# Patient Record
Sex: FEMALE | Race: WHITE | HISPANIC OR LATINO | URBAN - METROPOLITAN AREA
[De-identification: names, ages, dates, MRNs, and addresses within clinical notes are randomized per-mention and may not be internally consistent; named-entity substitution may affect disease eponyms.]

---

## 2020-06-18 ENCOUNTER — TELEHEALTH PROVIDED OTHER THAN IN PATIENT'S HOME (OUTPATIENT)
Dept: URBAN - METROPOLITAN AREA TELEHEALTH 12 | Facility: TELEHEALTH | Age: 65
End: 2020-06-18
Payer: COMMERCIAL

## 2020-06-18 VITALS — WEIGHT: 160 LBS | HEIGHT: 67 IN

## 2020-06-18 DIAGNOSIS — R68.81 EARLY SATIETY: ICD-10-CM

## 2020-06-18 DIAGNOSIS — Z12.11 ENCOUNTER FOR SCREENING FOR MALIGNANT NEOPLASM OF COLON: ICD-10-CM

## 2020-06-18 DIAGNOSIS — K30 FUNCTIONAL DYSPEPSIA: ICD-10-CM

## 2020-06-18 PROCEDURE — 99244 OFF/OP CNSLTJ NEW/EST MOD 40: CPT | Mod: 95 | Performed by: INTERNAL MEDICINE

## 2020-06-18 NOTE — SERVICEHPINOTES
PATIENT VERIFIED BY DATE OF BIRTH AND NAME. Patient has been consented for this telecommunication visit.   64 yo fm with having GI issues for some time. Has chronic constipation - takes MOM to have a BM for several years. Pt states this works for her. Pt having issues with epigastric fullness at times. Pt has a dyspepsia feeling and some lightheadedness at times. Pt has some nausea as well. Pt states sx are getting somewhat better at times. Pt thought due to Zinc and was taking this since Covid19 hit. Pt thinks Zinc might have been causing sx and thus stopped this recently. Pt has stress with losing her brother tragically as well. Pt had issues with dyspepsia in the past - before better with probiotics, this time not improved. 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 Pt sx mostly in her epigastric region. On Atenolol for heart palpitations. Has low thyroid testing as well in the past. Pt not on thyroid meds at this time. Colonoscopy done at 51 and 61, the last at [facility]. Pt denies any real change in weight. Pt does have some poor appetite when she has dyspepsia sx.  Pt states taking Panto x 1-2 weeks, some improvement.

## 2020-06-18 NOTE — SERVICENOTES
Patient's visit was conducted through video telecommunication. Patient consented before the start of visit as to understanding of privacy concerns, possible technological failure, and their responsibility of carrying out instructions of plan.

## 2021-11-30 ENCOUNTER — NEW PATIENT COMPREHENSIVE (OUTPATIENT)
Age: 66
End: 2021-11-30

## 2021-11-30 DIAGNOSIS — H52.13: ICD-10-CM

## 2021-11-30 PROCEDURE — 92004 COMPRE OPH EXAM NEW PT 1/>: CPT

## 2021-11-30 PROCEDURE — 92310-2 LEVEL 2 CONTACT LENS MANAGEMENT

## 2021-11-30 PROCEDURE — 92015 DETERMINE REFRACTIVE STATE: CPT

## 2021-11-30 ASSESSMENT — KERATOMETRY
OD_K1POWER_DIOPTERS: 43.75
OS_AXISANGLE_DEGREES: 009
OS_K2POWER_DIOPTERS: 44.75
OS_AXISANGLE2_DEGREES: 99
OS_K1POWER_DIOPTERS: 43.75
OD_K2POWER_DIOPTERS: 44.75
OD_AXISANGLE_DEGREES: 011
OD_AXISANGLE2_DEGREES: 101

## 2021-11-30 ASSESSMENT — VISUAL ACUITY
OD_CC: 20/20-2
OS_CC: 20/20-2
OD_SC: 20/400
OS_SC: 20/400

## 2021-11-30 ASSESSMENT — TONOMETRY
OS_IOP_MMHG: 19
OD_IOP_MMHG: 19

## 2021-12-06 ENCOUNTER — CONTACT LENSES/GLASSES VISIT (OUTPATIENT)
Age: 66
End: 2021-12-06

## 2021-12-06 DIAGNOSIS — H52.13: ICD-10-CM

## 2021-12-06 DIAGNOSIS — H25.13: ICD-10-CM

## 2021-12-06 DIAGNOSIS — H04.123: ICD-10-CM

## 2021-12-06 PROCEDURE — 92310F

## 2021-12-06 ASSESSMENT — KERATOMETRY
OS_AXISANGLE_DEGREES: 009
OS_K2POWER_DIOPTERS: 44.75
OD_AXISANGLE2_DEGREES: 101
OD_K2POWER_DIOPTERS: 44.75
OD_AXISANGLE_DEGREES: 011
OD_K1POWER_DIOPTERS: 43.75
OS_AXISANGLE2_DEGREES: 99
OS_K1POWER_DIOPTERS: 43.75

## 2021-12-06 ASSESSMENT — VISUAL ACUITY
OU_CC: J1+3
OS_CC: 20/20
OD_CC: 20/100

## 2022-07-30 ENCOUNTER — TELEPHONE ENCOUNTER (OUTPATIENT)
Age: 67
End: 2022-07-30

## 2022-07-31 ENCOUNTER — TELEPHONE ENCOUNTER (OUTPATIENT)
Age: 67
End: 2022-07-31

## 2022-08-11 ENCOUNTER — WEB ENCOUNTER (OUTPATIENT)
Dept: URBAN - METROPOLITAN AREA CLINIC 9 | Facility: CLINIC | Age: 67
End: 2022-08-11

## 2022-08-15 ENCOUNTER — TELEHEALTH PROVIDED IN PATIENT'S HOME (OUTPATIENT)
Dept: URBAN - METROPOLITAN AREA TELEHEALTH 3 | Facility: TELEHEALTH | Age: 67
End: 2022-08-15
Payer: COMMERCIAL

## 2022-08-15 VITALS — WEIGHT: 147 LBS | HEIGHT: 67 IN

## 2022-08-15 DIAGNOSIS — K21.9 GASTRO-ESOPHAGEAL REFLUX DISEASE WITHOUT ESOPHAGITIS: ICD-10-CM

## 2022-08-15 DIAGNOSIS — R10.13 EPIGASTRIC PAIN: ICD-10-CM

## 2022-08-15 PROCEDURE — 99214 OFFICE O/P EST MOD 30 MIN: CPT | Mod: 95 | Performed by: INTERNAL MEDICINE

## 2022-08-15 NOTE — SERVICEHPINOTES
PATIENT VERIFIED BY DATE OF BIRTH AND NAME. Patient has been consented for this telecommunication visit.   66 yo fm with fu for GERD and some heartburn sx at times.  Pt was on Nexium OTC.  Pt has lost 20 lbs, was on a special diet - mostly their food.  Pt drinking more water for the most part.  Pt states had some epigastric pain at times.  Pt pain was very light in severity and now worse for some time.  Pt went to the ER and states workup was for the most part negative.  Pt now on BID ppi for some time.  Feels that sx may be due to gallstones.  Last time took PPI and sx resolved quickly, now sx persist despite PPI but better overall.  Pt moved to [place], [place] and already has an appt there with a GI MD.  ROS o/w negative.

## 2022-08-17 ENCOUNTER — OFFICE VISIT (OUTPATIENT)
Dept: URBAN - METROPOLITAN AREA CLINIC 9 | Facility: CLINIC | Age: 67
End: 2022-08-17
Payer: MEDICARE

## 2022-08-17 VITALS
HEIGHT: 66 IN | WEIGHT: 149 LBS | SYSTOLIC BLOOD PRESSURE: 124 MMHG | DIASTOLIC BLOOD PRESSURE: 70 MMHG | BODY MASS INDEX: 23.95 KG/M2

## 2022-08-17 DIAGNOSIS — K59.01 CONSTIPATION: ICD-10-CM

## 2022-08-17 DIAGNOSIS — K21.9 GASTROESOPHAGEAL REFLUX DISEASE, UNSPECIFIED WHETHER ESOPHAGITIS PRESENT: ICD-10-CM

## 2022-08-17 DIAGNOSIS — M54.9 BACK PAIN: ICD-10-CM

## 2022-08-17 DIAGNOSIS — R14.0 BLOAT: ICD-10-CM

## 2022-08-17 DIAGNOSIS — R79.89 ABNORMAL LIVER FUNCTION TEST: ICD-10-CM

## 2022-08-17 DIAGNOSIS — Z12.11 COLON CANCER SCREENING: ICD-10-CM

## 2022-08-17 PROCEDURE — 99204 OFFICE O/P NEW MOD 45 MIN: CPT | Performed by: INTERNAL MEDICINE

## 2022-08-17 RX ORDER — PANTOPRAZOLE 40 MG/1
TAKE 1 TABLET BY MOUTH TWICE DAILY TABLET, DELAYED RELEASE ORAL
Qty: 60 EACH | Refills: 0 | Status: ACTIVE | COMMUNITY

## 2022-08-17 RX ORDER — ATENOLOL 25 MG/1
TABLET ORAL
Qty: 90 TABLET | Status: ACTIVE | COMMUNITY

## 2022-08-17 RX ORDER — PANTOPRAZOLE SODIUM 40 MG/1
1 TABLET TABLET, DELAYED RELEASE ORAL TWICE A DAY
Qty: 60 TABLET | Refills: 3 | OUTPATIENT

## 2022-08-17 NOTE — HPI-TODAY'S VISIT:
67-year-old female comes in with symptomatology.  She says she started having this constant upper kind of mid back pain very focally.  Then she also had some acid reflux with this.  She went to an ER who put her on Protonix twice a day.  This is helped kind of decrease the acid but not the constant back pain.  She also describes gas which is intermittent focal pain that is migratory throughout her abdomen.  She has suffered from chronic constipation so she takes mag citrate twice a day.  She is actually been going more recently than before.  She has never had an EGD.  Denies family history of GI malignancies.  Last colonoscopy was 6 years ago and was normal.  She has been actively trying to lose weight she is lost 20 pounds since May

## 2022-08-18 ENCOUNTER — EMERGENCY VISIT (OUTPATIENT)
Dept: URBAN - METROPOLITAN AREA CLINIC 27 | Facility: CLINIC | Age: 67
End: 2022-08-18

## 2022-08-18 DIAGNOSIS — H04.123: ICD-10-CM

## 2022-08-18 DIAGNOSIS — H25.13: ICD-10-CM

## 2022-08-18 PROCEDURE — 99213 OFFICE O/P EST LOW 20 MIN: CPT

## 2022-08-18 ASSESSMENT — KERATOMETRY
OS_AXISANGLE_DEGREES: 009
OD_K2POWER_DIOPTERS: 44.75
OD_AXISANGLE2_DEGREES: 101
OD_K1POWER_DIOPTERS: 43.75
OD_AXISANGLE_DEGREES: 011
OS_K2POWER_DIOPTERS: 44.75
OS_AXISANGLE2_DEGREES: 99
OS_K1POWER_DIOPTERS: 43.75

## 2022-08-18 ASSESSMENT — TONOMETRY
OS_IOP_MMHG: 19
OD_IOP_MMHG: 19

## 2022-08-18 ASSESSMENT — VISUAL ACUITY
OS_CC: 20/20
OD_CC: J1+

## 2022-09-07 PROBLEM — 266435005 GASTRO-ESOPHAGEAL REFLUX DISEASE WITHOUT ESOPHAGITIS: Status: ACTIVE | Noted: 2022-09-07

## 2022-09-15 ENCOUNTER — OFFICE VISIT (OUTPATIENT)
Dept: URBAN - METROPOLITAN AREA SURGERY CENTER 9 | Facility: SURGERY CENTER | Age: 67
End: 2022-09-15

## 2022-12-19 ENCOUNTER — APPOINTMENT (RX ONLY)
Dept: URBAN - METROPOLITAN AREA CLINIC 116 | Facility: CLINIC | Age: 67
Setting detail: DERMATOLOGY
End: 2022-12-19

## 2022-12-19 DIAGNOSIS — L65.9 NONSCARRING HAIR LOSS, UNSPECIFIED: ICD-10-CM

## 2022-12-19 PROCEDURE — 11104 PUNCH BX SKIN SINGLE LESION: CPT

## 2022-12-19 PROCEDURE — ? COUNSELING

## 2022-12-19 PROCEDURE — ? PRESCRIPTION MEDICATION MANAGEMENT

## 2022-12-19 PROCEDURE — ? BIOPSY BY PUNCH METHOD

## 2022-12-19 ASSESSMENT — LOCATION ZONE DERM
LOCATION ZONE: SCALP
LOCATION ZONE: FACE

## 2022-12-19 ASSESSMENT — LOCATION SIMPLE DESCRIPTION DERM
LOCATION SIMPLE: RIGHT FOREHEAD
LOCATION SIMPLE: RIGHT SCALP

## 2022-12-19 ASSESSMENT — LOCATION DETAILED DESCRIPTION DERM
LOCATION DETAILED: RIGHT SUPERIOR MEDIAL FOREHEAD
LOCATION DETAILED: RIGHT CENTRAL FRONTAL SCALP

## 2022-12-19 NOTE — PROCEDURE: PRESCRIPTION MEDICATION MANAGEMENT
Plan: Hold treatment pending path\\ncontinue rogaine for women and nutrafol
Render In Strict Bullet Format?: No
Detail Level: Simple

## 2022-12-19 NOTE — HPI: HAIR LOSS
Previous Labs: No
How Did The Hair Loss Occur?: gradual in onset
How Severe Is Your Hair Loss?: mild
What Hair Products Do You Use?: Pt does not know
Additional History: Patient states she uses rogaine. She does not remember what shampoo she uses.

## 2022-12-19 NOTE — PROCEDURE: BIOPSY BY PUNCH METHOD

## 2023-01-12 ENCOUNTER — TELEPHONE ENCOUNTER (OUTPATIENT)
Dept: URBAN - METROPOLITAN AREA CLINIC 7 | Facility: CLINIC | Age: 68
End: 2023-01-12

## 2023-01-19 ENCOUNTER — OFFICE VISIT (OUTPATIENT)
Dept: URBAN - METROPOLITAN AREA SURGERY CENTER 9 | Facility: SURGERY CENTER | Age: 68
End: 2023-01-19

## 2023-01-25 ENCOUNTER — APPOINTMENT (RX ONLY)
Dept: URBAN - METROPOLITAN AREA CLINIC 116 | Facility: CLINIC | Age: 68
Setting detail: DERMATOLOGY
End: 2023-01-25

## 2023-01-25 DIAGNOSIS — L65.9 NONSCARRING HAIR LOSS, UNSPECIFIED: ICD-10-CM

## 2023-01-25 PROCEDURE — ? TREATMENT REGIMEN

## 2023-01-25 PROCEDURE — ? PHOTO-DOCUMENTATION

## 2023-01-25 PROCEDURE — ? DIAGNOSIS COMMENT

## 2023-01-25 PROCEDURE — ? COUNSELING

## 2023-01-25 PROCEDURE — 99213 OFFICE O/P EST LOW 20 MIN: CPT

## 2023-01-25 ASSESSMENT — LOCATION ZONE DERM: LOCATION ZONE: SCALP

## 2023-01-25 ASSESSMENT — LOCATION SIMPLE DESCRIPTION DERM: LOCATION SIMPLE: ANTERIOR SCALP

## 2023-01-25 ASSESSMENT — LOCATION DETAILED DESCRIPTION DERM: LOCATION DETAILED: MID-FRONTAL SCALP

## 2023-01-25 NOTE — PROCEDURE: TREATMENT REGIMEN
Plan: Patient will follow up with her primary care physician to order labs for vitamin/iron deficiencies.
Detail Level: Detailed
Continue Regimen: Nutrofol vitamins, Rogaine for women, Nioxin shampoo conditioner

## 2023-02-20 PROBLEM — 14760008 CONSTIPATION: Status: ACTIVE | Noted: 2022-08-17

## 2023-02-20 PROBLEM — 235595009: Status: ACTIVE | Noted: 2022-08-26

## 2023-02-21 ENCOUNTER — OFFICE VISIT (OUTPATIENT)
Dept: URBAN - METROPOLITAN AREA CLINIC 9 | Facility: CLINIC | Age: 68
End: 2023-02-21

## 2023-02-21 RX ORDER — PANTOPRAZOLE SODIUM 40 MG/1
1 TABLET TABLET, DELAYED RELEASE ORAL TWICE A DAY
Qty: 60 TABLET | Refills: 3 | OUTPATIENT

## 2023-02-21 NOTE — HPI-TODAY'S VISIT:
67-year-old female comes in with symptomatology.  She says she started having this constant upper kind of mid back pain very focally.  Then she also had some acid reflux with this.  She went to an ER who put her on Protonix twice a day.  This is helped kind of decrease the acid but not the constant back pain.  She also describes gas which is intermittent focal pain that is migratory throughout her abdomen.  She has suffered from chronic constipation so she takes mag citrate twice a day.  She is actually been going more recently than before.  She has never had an EGD.  Denies family history of GI malignancies.  Last colonoscopy was 6 years ago and was normal.  She has been actively trying to lose weight she is lost 20 pounds since May At last visit we requested LFTs in [place] but did not get those.  We proceeded with EGD with gastric biopsies  and then it was negative we would decrease Protonix plus or minus CT. EGD.

## 2023-04-11 ENCOUNTER — WEB ENCOUNTER (OUTPATIENT)
Dept: URBAN - METROPOLITAN AREA CLINIC 9 | Facility: CLINIC | Age: 68
End: 2023-04-11

## 2023-04-11 ENCOUNTER — DASHBOARD ENCOUNTERS (OUTPATIENT)
Age: 68
End: 2023-04-11

## 2023-04-11 ENCOUNTER — OFFICE VISIT (OUTPATIENT)
Dept: URBAN - METROPOLITAN AREA CLINIC 9 | Facility: CLINIC | Age: 68
End: 2023-04-11
Payer: MEDICARE

## 2023-04-11 VITALS
SYSTOLIC BLOOD PRESSURE: 132 MMHG | DIASTOLIC BLOOD PRESSURE: 78 MMHG | HEIGHT: 66 IN | WEIGHT: 148 LBS | BODY MASS INDEX: 23.78 KG/M2

## 2023-04-11 DIAGNOSIS — K21.9 GASTROESOPHAGEAL REFLUX DISEASE, UNSPECIFIED WHETHER ESOPHAGITIS PRESENT: ICD-10-CM

## 2023-04-11 DIAGNOSIS — K59.01 CONSTIPATION: ICD-10-CM

## 2023-04-11 DIAGNOSIS — Z12.11 COLON CANCER SCREENING: ICD-10-CM

## 2023-04-11 DIAGNOSIS — R79.89 ABNORMAL LIVER FUNCTION TEST: ICD-10-CM

## 2023-04-11 DIAGNOSIS — R14.0 BLOAT: ICD-10-CM

## 2023-04-11 DIAGNOSIS — M54.9 BACK PAIN: ICD-10-CM

## 2023-04-11 PROCEDURE — 99214 OFFICE O/P EST MOD 30 MIN: CPT | Performed by: INTERNAL MEDICINE

## 2023-04-11 RX ORDER — PANTOPRAZOLE 40 MG/1
TAKE 1 TABLET BY MOUTH TWICE DAILY TABLET, DELAYED RELEASE ORAL
Qty: 60 EACH | Refills: 0 | Status: DISCONTINUED | COMMUNITY

## 2023-04-11 RX ORDER — ESOMEPRAZOLE MAGNESIUM 20 MG/1
1 CAPSULE CAPSULE, DELAYED RELEASE ORAL ONCE A DAY
Status: ACTIVE | COMMUNITY

## 2023-04-11 RX ORDER — PANTOPRAZOLE SODIUM 40 MG/1
1 TABLET TABLET, DELAYED RELEASE ORAL TWICE A DAY
Qty: 60 TABLET | Refills: 3 | OUTPATIENT

## 2023-04-11 RX ORDER — PANTOPRAZOLE SODIUM 40 MG/1
1 TABLET TABLET, DELAYED RELEASE ORAL TWICE A DAY
Qty: 60 TABLET | Refills: 3 | Status: DISCONTINUED | COMMUNITY

## 2023-04-11 RX ORDER — MAGNESIUM HYDROXIDE 400 MG/5ML
5 ML AT LEAST 4 HOURS BETWEEN DOSES AS NEEDED SUSPENSION, ORAL (FINAL DOSE FORM) ORAL
Status: ACTIVE | COMMUNITY

## 2023-04-11 RX ORDER — ATENOLOL 25 MG/1
12.5MG TABLET ORAL ONCE A DAY
Status: ACTIVE | COMMUNITY

## 2023-04-11 NOTE — HPI-TODAY'S VISIT:
67-year-old female comes in with symptomatology.  She says she started having this constant upper kind of mid back pain very focally.  Then she also had some acid reflux with this.  She went to an ER who put her on Protonix twice a day.  This is helped kind of decrease the acid but not the constant back pain.  She also describes gas which is intermittent focal pain that is migratory throughout her abdomen.  She has suffered from chronic constipation so she takes mag citrate twice a day.  She is actually been going more recently than before.  She has never had an EGD.  Denies family history of GI malignancies.  Last colonoscopy was 6 years ago and was normal.  She has been actively trying to lose weight she is lost 20 pounds since May At last visit we requested LFTs in [place] but did not get those.  We proceeded with EGD with gastric biopsies  and then it was negative we would decrease Protonix plus or minus CT. EGD was not done  She comes in today for follow.  She says EGD was not done as all her symptoms have resolved.  Her main issue right now is constipation.  She said this is a lifelong problem.  She has daily bowel movements on her regular milk of magnesia regiment but they are loose and she does not love this.  But she is never uncomfortable bloated or anything like that

## 2024-02-20 ENCOUNTER — COMPREHENSIVE EXAM (OUTPATIENT)
Dept: URBAN - METROPOLITAN AREA CLINIC 27 | Facility: CLINIC | Age: 69
End: 2024-02-20

## 2024-02-20 DIAGNOSIS — H52.203: ICD-10-CM

## 2024-02-20 PROCEDURE — 92015 DETERMINE REFRACTIVE STATE: CPT

## 2024-02-20 PROCEDURE — 92310-3 LEVEL 3 CONTACT LENS MANAGEMENT

## 2024-02-20 PROCEDURE — 92014 COMPRE OPH EXAM EST PT 1/>: CPT

## 2024-02-20 ASSESSMENT — KERATOMETRY
OS_AXISANGLE2_DEGREES: 99
OS_K2POWER_DIOPTERS: 44.75
OS_K1POWER_DIOPTERS: 43.75
OD_AXISANGLE_DEGREES: 011
OS_AXISANGLE_DEGREES: 009
OD_K2POWER_DIOPTERS: 44.75
OD_K1POWER_DIOPTERS: 43.75
OD_AXISANGLE2_DEGREES: 101

## 2024-02-20 ASSESSMENT — TONOMETRY
OD_IOP_MMHG: 18
OS_IOP_MMHG: 16

## 2024-02-20 ASSESSMENT — VISUAL ACUITY
OS_CC: 20/20
OD_CC: J1

## 2024-03-14 ENCOUNTER — CONTACT LENSES/GLASSES VISIT (OUTPATIENT)
Dept: URBAN - METROPOLITAN AREA CLINIC 27 | Facility: CLINIC | Age: 69
End: 2024-03-14

## 2024-03-14 DIAGNOSIS — H52.203: ICD-10-CM

## 2024-03-14 PROCEDURE — 92310F

## 2024-03-14 ASSESSMENT — KERATOMETRY
OS_K2POWER_DIOPTERS: 44.75
OD_K2POWER_DIOPTERS: 44.75
OD_K1POWER_DIOPTERS: 43.75
OD_AXISANGLE2_DEGREES: 101
OD_AXISANGLE_DEGREES: 011
OS_AXISANGLE2_DEGREES: 99
OS_K1POWER_DIOPTERS: 43.75
OS_AXISANGLE_DEGREES: 009